# Patient Record
Sex: MALE | Race: AMERICAN INDIAN OR ALASKA NATIVE | Employment: OTHER | URBAN - METROPOLITAN AREA
[De-identification: names, ages, dates, MRNs, and addresses within clinical notes are randomized per-mention and may not be internally consistent; named-entity substitution may affect disease eponyms.]

---

## 2017-08-14 NOTE — PROCEDURE NOTE: CLINICAL
PROCEDURE NOTE: Eylea #3 OS. Diagnosis: Diabetes, Type II with Ocular Complications. Prep: Betadine Flush. Prior to injection, risks/benefits/alternatives discussed including corneal abrasion, infection, loss of vision, hemorrhage, cataract, glaucoma, retinal tears or detachment. A written consent is on file, and the need for today’s injection was discussed and the patient is understanding and wishes to proceed. The entire vial of Eylea was drawn up into a syringe. The opened vial, remaining drug, and filtered needle were disposed of in a certified biohazard container. Betadine prep was performed. Topical anesthesia was induced with Alcaine. 4% lidocaine pledge. A lid speculum was used. A short 30g needle on a 1cc syringe was used with product that that had previously been prepared under sterile conditions. Injection site: 3-4 mm from the limbus. The used syringe/needle was transferred to a biohazard container. Lid speculum removed. Mask worn during procedure. Patient tolerated procedure well. Count fingers vision was verified. There were no complications. Patient was given the standard instruction sheet. Patient given office phone number/answering service number and advised to call immediately should there be loss of vision or pain, or should they have any other questions or concerns. Saad Chan

## 2017-09-18 NOTE — PATIENT DISCUSSION
Recommended Eylea#4 injection today. Injection given without complication and tolerated well. Post injection instructions given and understood by patient.

## 2017-09-18 NOTE — PROCEDURE NOTE: CLINICAL
PROCEDURE NOTE: Eylea #4 OS. Diagnosis: Neovascular AMD with Active CNV. Prior to injection, risks/benefits/alternatives discussed including corneal abrasion, infection, loss of vision, hemorrhage, cataract, glaucoma, retinal tears or detachment. A written consent is on file, and the need for today’s injection was discussed and the patient is understanding and wishes to proceed. The entire vial of Eylea was drawn up into a syringe. The opened vial, remaining drug, and filtered needle were disposed of in a certified biohazard container. Betadine prep was performed. Topical anesthesia was induced with Alcaine. 4% lidocaine pledge. A lid speculum was used. A short 30g needle on a 1cc syringe was used with product that that had previously been prepared under sterile conditions. Injection site: 3-4 mm from the limbus. The used syringe/needle was transferred to a biohazard container. Lid speculum removed. Mask worn during procedure. Patient tolerated procedure well. Count fingers vision was verified. There were no complications. Patient was given the standard instruction sheet. Patient given office phone number/answering service number and advised to call immediately should there be loss of vision or pain, or should they have any other questions or concerns. Azeem Mallory

## 2017-10-02 NOTE — PROCEDURE NOTE: CLINICAL
PROCEDURE NOTE: Eylea #17 OD. Diagnosis: Neovascular AMD with Active CNV. Prior to injection, risks/benefits/alternatives discussed including corneal abrasion, infection, loss of vision, hemorrhage, cataract, glaucoma, retinal tears or detachment. A written consent is on file, and the need for today’s injection was discussed and the patient is understanding and wishes to proceed. The entire vial of Eylea was drawn up into a syringe. The opened vial, remaining drug, and filtered needle were disposed of in a certified biohazard container. Betadine prep was performed. Topical anesthesia was induced with Alcaine. 4% lidocaine pledge. A lid speculum was used. A short 30g needle on a 1cc syringe was used with product that that had previously been prepared under sterile conditions. Injection site: 3-4 mm from the limbus. The used syringe/needle was transferred to a biohazard container. Lid speculum removed. Mask worn during procedure. Patient tolerated procedure well. Count fingers vision was verified. There were no complications. Patient was given the standard instruction sheet. Patient given office phone number/answering service number and advised to call immediately should there be loss of vision or pain, or should they have any other questions or concerns. Azeem Mallory

## 2017-10-02 NOTE — PATIENT DISCUSSION
Treatment only today. Eylea # 17 given today with out complication. Post injection instructions given. Pt verbalized understanding. Call with any [facility] changes.

## 2017-11-06 NOTE — PROCEDURE NOTE: CLINICAL
PROCEDURE NOTE: Eylea #5 OS. Diagnosis: Neovascular AMD with Active CNV. Prior to injection, risks/benefits/alternatives discussed including corneal abrasion, infection, loss of vision, hemorrhage, cataract, glaucoma, retinal tears or detachment. A written consent is on file, and the need for today’s injection was discussed and the patient is understanding and wishes to proceed. The entire vial of Eylea was drawn up into a syringe. The opened vial, remaining drug, and filtered needle were disposed of in a certified biohazard container. Betadine prep was performed. Topical anesthesia was induced with Alcaine. 4% lidocaine pledge. A lid speculum was used. A short 30g needle on a 1cc syringe was used with product that that had previously been prepared under sterile conditions. Injection site: 3-4 mm from the limbus. The used syringe/needle was transferred to a biohazard container. Lid speculum removed. Mask worn during procedure. Patient tolerated procedure well. Count fingers vision was verified. There were no complications. Patient was given the standard instruction sheet. Patient given office phone number/answering service number and advised to call immediately should there be loss of vision or pain, or should they have any other questions or concerns. Kandace Polanco

## 2017-11-06 NOTE — PATIENT DISCUSSION
Recommended Eylea#5  injection today then injection q 10 weeks. Injection given without complication and tolerated well. Post injection instructions given and understood by patient.

## 2017-11-20 NOTE — PROCEDURE NOTE: CLINICAL
PROCEDURE NOTE: Eylea #18 OD. Diagnosis: Neovascular AMD with Active CNV. Prep: Betadine Drops and Betadine Scrub. Prior to injection, risks/benefits/alternatives discussed including corneal abrasion, infection, loss of vision, hemorrhage, cataract, glaucoma, retinal tears or detachment. A written consent is on file, and the need for today’s injection was discussed and the patient is understanding and wishes to proceed. The entire vial of Eylea was drawn up into a syringe. The opened vial, remaining drug, and filtered needle were disposed of in a certified biohazard container. Betadine prep was performed. Topical anesthesia was induced with Alcaine. 4% lidocaine pledge. A lid speculum was used. A short 30g needle on a 1cc syringe was used with product that that had previously been prepared under sterile conditions. Injection site: 3-4 mm from the limbus. The used syringe/needle was transferred to a biohazard container. Lid speculum removed. Mask worn during procedure. Patient tolerated procedure well. Count fingers vision was verified. There were no complications. Patient was given the standard instruction sheet. Patient given office phone number/answering service number and advised to call immediately should there be loss of vision or pain, or should they have any other questions or concerns. Rony Guzman

## 2017-11-20 NOTE — PATIENT DISCUSSION
Injection given without complication and tolerated well. Post injection instructions given and understood by patient.

## 2017-12-18 NOTE — PROCEDURE NOTE: CLINICAL
PROCEDURE NOTE: Eylea #19 OD. Diagnosis: Neovascular AMD with Active CNV. Prep: Betadine Drops and Betadine Scrub. Prior to injection, risks/benefits/alternatives discussed including corneal abrasion, infection, loss of vision, hemorrhage, cataract, glaucoma, retinal tears or detachment. A written consent is on file, and the need for today’s injection was discussed and the patient is understanding and wishes to proceed. The entire vial of Eylea was drawn up into a syringe. The opened vial, remaining drug, and filtered needle were disposed of in a certified biohazard container. Betadine prep was performed. Topical anesthesia was induced with Alcaine. 4% lidocaine pledge. A lid speculum was used. A short 30g needle on a 1cc syringe was used with product that that had previously been prepared under sterile conditions. Injection site: 3-4 mm from the limbus. The used syringe/needle was transferred to a biohazard container. Lid speculum removed. Mask worn during procedure. Patient tolerated procedure well. Count fingers vision was verified. There were no complications. Patient was given the standard instruction sheet. Patient given office phone number/answering service number and advised to call immediately should there be loss of vision or pain, or should they have any other questions or concerns. Richelle Kline

## 2017-12-18 NOTE — PATIENT DISCUSSION
Injection given without complication and tolerated well. Post injection instructions given and understood by patient. return Q7 weeks.

## 2018-01-18 NOTE — PATIENT DISCUSSION
Dariena #6 injection recommended today after discussion of benefits, risks, alternatives. The injection was administered without complication. Post-injection instructions were reviewed and understood by the patient.

## 2018-01-18 NOTE — PROCEDURE NOTE: CLINICAL
PROCEDURE NOTE: Eylea #6 OS. Diagnosis: Neovascular AMD with Active CNV. Prep: Betadine Drops and Betadine Scrub. Prior to injection, risks/benefits/alternatives discussed including corneal abrasion, infection, loss of vision, hemorrhage, cataract, glaucoma, retinal tears or detachment. A written consent is on file, and the need for today’s injection was discussed and the patient is understanding and wishes to proceed. The entire vial of Eylea was drawn up into a syringe. The opened vial, remaining drug, and filtered needle were disposed of in a certified biohazard container. Betadine prep was performed. Topical anesthesia was induced with Alcaine. 4% lidocaine pledge. A lid speculum was used. A short 30g needle on a 1cc syringe was used with product that that had previously been prepared under sterile conditions. Injection site: 3-4 mm from the limbus. The used syringe/needle was transferred to a biohazard container. Lid speculum removed. Mask worn during procedure. Patient tolerated procedure well. Count fingers vision was verified. There were no complications. Patient was given the standard instruction sheet. Patient given office phone number/answering service number and advised to call immediately should there be loss of vision or pain, or should they have any other questions or concerns. Ladan Lubin

## 2018-02-15 NOTE — PROCEDURE NOTE: CLINICAL
PROCEDURE NOTE: Eylea #20 OD. Diagnosis: Neovascular AMD with Active CNV. Prep: Betadine Drops and Betadine Scrub. Prior to injection, risks/benefits/alternatives discussed including corneal abrasion, infection, loss of vision, hemorrhage, cataract, glaucoma, retinal tears or detachment. A written consent is on file, and the need for today’s injection was discussed and the patient is understanding and wishes to proceed. The entire vial of Eylea was drawn up into a syringe. The opened vial, remaining drug, and filtered needle were disposed of in a certified biohazard container. Betadine prep was performed. Topical anesthesia was induced with Alcaine. 4% lidocaine pledge. A lid speculum was used. A short 30g needle on a 1cc syringe was used with product that that had previously been prepared under sterile conditions. Injection site: 3-4 mm from the limbus. The used syringe/needle was transferred to a biohazard container. Lid speculum removed. Mask worn during procedure. Patient tolerated procedure well. Count fingers vision was verified. There were no complications. Patient was given the standard instruction sheet. Patient given office phone number/answering service number and advised to call immediately should there be loss of vision or pain, or should they have any other questions or concerns. Rony Guzman

## 2018-02-15 NOTE — PATIENT DISCUSSION
eylea injection recommended today after discussion of benefits, risks,  The injection was administered without complication. Post-injection instructions were reviewed and understood by the patient.

## 2018-03-05 NOTE — PROCEDURE NOTE: CLINICAL
PROCEDURE NOTE: Punctal Plugs, Extended #2 Bilateral Lower Lids. Diagnosis: Dry Eye Syndrome. Prior to treatment, the risks/benefits/alternatives were discussed. The patient wished to proceed with procedure. Patient tolerated procedure well. There were no complications. Post procedure instructions given. Size *0.6mm Oasis Softplug.

## 2018-03-15 NOTE — PATIENT DISCUSSION
Bonnie #7 injection recommended today after discussion of benefits, risks, and alternatives. The patient elects to proceed The injection was administered without complication. Post-injection instructions were reviewed and understood by the patient.

## 2018-03-15 NOTE — PROCEDURE NOTE: CLINICAL
PROCEDURE NOTE: Eylea #7 OS. Diagnosis: Neovascular AMD with Active CNV. Prior to injection, risks/benefits/alternatives discussed including corneal abrasion, infection, loss of vision, hemorrhage, cataract, glaucoma, retinal tears or detachment. A written consent is on file, and the need for today’s injection was discussed and the patient is understanding and wishes to proceed. The entire vial of Eylea was drawn up into a syringe. The opened vial, remaining drug, and filtered needle were disposed of in a certified biohazard container. Betadine prep was performed. Topical anesthesia was induced with Alcaine. 4% lidocaine pledge. A lid speculum was used. A short 30g needle on a 1cc syringe was used with product that that had previously been prepared under sterile conditions. Injection site: 3-4 mm from the limbus. The used syringe/needle was transferred to a biohazard container. Lid speculum removed. Mask worn during procedure. Patient tolerated procedure well. Count fingers vision was verified. There were no complications. Patient was given the standard instruction sheet. Patient given office phone number/answering service number and advised to call immediately should there be loss of vision or pain, or should they have any other questions or concerns. Shawn Steven

## 2018-03-29 NOTE — PROCEDURE NOTE: CLINICAL
PROCEDURE NOTE: Eylea #21 OD. Diagnosis: Neovascular AMD with Active CNV. Prior to injection, risks/benefits/alternatives discussed including corneal abrasion, infection, loss of vision, hemorrhage, cataract, glaucoma, retinal tears or detachment. A written consent is on file, and the need for today’s injection was discussed and the patient is understanding and wishes to proceed. The entire vial of Eylea was drawn up into a syringe. The opened vial, remaining drug, and filtered needle were disposed of in a certified biohazard container. Betadine prep was performed. Topical anesthesia was induced with Alcaine. 4% lidocaine pledge. A lid speculum was used. A short 30g needle on a 1cc syringe was used with product that that had previously been prepared under sterile conditions. Injection site: 3-4 mm from the limbus. The used syringe/needle was transferred to a biohazard container. Lid speculum removed. Mask worn during procedure. Patient tolerated procedure well. Count fingers vision was verified. There were no complications. Patient was given the standard instruction sheet. Patient given office phone number/answering service number and advised to call immediately should there be loss of vision or pain, or should they have any other questions or concerns. Kandace Polanco

## 2018-03-29 NOTE — PATIENT DISCUSSION
Eylea injection recommended today after discussion of benefits, risks, and alternatives. The patient elects to proceed The injection was administered without complication. Post-injection instructions were reviewed and understood by the patient.

## 2018-04-25 NOTE — PROCEDURE NOTE: CLINICAL
PROCEDURE NOTE: Punctal Plugs, Silicone #1 Right Lower Lid. Anesthesia: Topical. Prep: Antibiotic Drops q 5min x 3. Prior to treatment, the risks/benefits/alternatives were discussed. The patient wished to proceed with procedure. Permanent silicone plugs were inserted. Patient tolerated procedure well. There were no complications. Post procedure instructions given. Size *. 6MM RLL.

## 2018-05-10 NOTE — PATIENT DISCUSSION
No new SRF/IRF.  Vision stable.  Will continue treatment  q 8 weeks OU.  Follow up in 2 weeks for injection only.

## 2018-05-10 NOTE — PATIENT DISCUSSION
5/10/18-Patient to keep 5/24/18 appointment for scheduled treatment OD, return in 8 weeks for possible repeat injection OS.  Patient receiving treatment OU q 8 weeks-alternating weeks.

## 2018-05-10 NOTE — PATIENT DISCUSSION
Decision to treat was made based on today's clinical findings. Recommend treatment today, goal is to maintain current level of vision.

## 2018-05-10 NOTE — PATIENT DISCUSSION
Minimal, Discussed the importance of blood pressure control in the prevention of ocular complications.

## 2018-05-10 NOTE — PATIENT DISCUSSION
Bonnie #8 injection recommended today after discussion of benefits, risks, alternatives. The injection was administered without complication. Post-injection instructions were reviewed and understood by the patient.

## 2018-05-10 NOTE — PROCEDURE NOTE: CLINICAL
PROCEDURE NOTE: Eylea #8 OS. Diagnosis: Neovascular AMD with Active CNV. Prior to injection, risks/benefits/alternatives discussed including corneal abrasion, infection, loss of vision, hemorrhage, cataract, glaucoma, retinal tears or detachment. A written consent is on file, and the need for today’s injection was discussed and the patient is understanding and wishes to proceed. The entire vial of Eylea was drawn up into a syringe. The opened vial, remaining drug, and filtered needle were disposed of in a certified biohazard container. Betadine prep was performed. Topical anesthesia was induced with Alcaine. 4% lidocaine pledge. A lid speculum was used. A short 30g needle on a 1cc syringe was used with product that that had previously been prepared under sterile conditions. Injection site: 3-4 mm from the limbus. The used syringe/needle was transferred to a biohazard container. Lid speculum removed. Mask worn during procedure. Patient tolerated procedure well. Count fingers vision was verified. There were no complications. Patient was given the standard instruction sheet. Patient given office phone number/answering service number and advised to call immediately should there be loss of vision or pain, or should they have any other questions or concerns. Barbi Balderrama

## 2018-05-24 NOTE — PATIENT DISCUSSION
Eylea injection recommended today after discussion of benefits, risks, alternatives. The injection was administered without complication. Post-injection instructions were reviewed and understood by the patient.  S/S of endophthalmitis discussed with patient, patient to call with any changes in vision or S/S of endophthalmitis.

## 2018-05-24 NOTE — PATIENT DISCUSSION
No new SRF/IRF.  Vision stable. Recommend treatment today as previously discussed.  Goal is to maintain current level of vision.  Patient states understanding.

## 2018-05-24 NOTE — PROCEDURE NOTE: CLINICAL
PROCEDURE NOTE: Eylea #22 OD. Diagnosis: Neovascular AMD with Active CNV. Prior to injection, risks/benefits/alternatives discussed including corneal abrasion, infection, loss of vision, hemorrhage, cataract, glaucoma, retinal tears or detachment. A written consent is on file, and the need for today’s injection was discussed and the patient is understanding and wishes to proceed. The entire vial of Eylea was drawn up into a syringe. The opened vial, remaining drug, and filtered needle were disposed of in a certified biohazard container. Betadine prep was performed. Topical anesthesia was induced with Alcaine. 4% lidocaine pledge. A lid speculum was used. A short 30g needle on a 1cc syringe was used with product that that had previously been prepared under sterile conditions. Injection site: 3-4 mm from the limbus. The used syringe/needle was transferred to a biohazard container. Lid speculum removed. Mask worn during procedure. Patient tolerated procedure well. Count fingers vision was verified. There were no complications. Patient was given the standard instruction sheet. Patient given office phone number/answering service number and advised to call immediately should there be loss of vision or pain, or should they have any other questions or concerns. Bennett Dickey

## 2018-06-19 NOTE — PROCEDURE NOTE: CLINICAL
PROCEDURE NOTE: Punctal Plugs, Silicone #1 Right Lower Lid. Prior to treatment, the risks/benefits/alternatives were discussed. The patient wished to proceed with procedure. Permanent silicone plugs were inserted. Patient tolerated procedure well. There were no complications. Post procedure instructions given. Size *.6mm RLL.

## 2018-07-12 NOTE — PATIENT DISCUSSION
Dariena #9 injection recommended today after discussion of benefits, risks, alternatives. The patient elects to proceed. The injection was administered without complication. Post-injection instructions were reviewed and understood by the patient.

## 2018-07-12 NOTE — PROCEDURE NOTE: CLINICAL
PROCEDURE NOTE: Eylea #9 OS. Diagnosis: Neovascular AMD with Active CNV. Prep: Betadine Drops and Betadine Scrub. Prior to injection, risks/benefits/alternatives discussed including corneal abrasion, infection, loss of vision, hemorrhage, cataract, glaucoma, retinal tears or detachment. A written consent is on file, and the need for today’s injection was discussed and the patient is understanding and wishes to proceed. The entire vial of Eylea was drawn up into a syringe. The opened vial, remaining drug, and filtered needle were disposed of in a certified biohazard container. Betadine prep was performed. Topical anesthesia was induced with Alcaine. 4% lidocaine pledge. A lid speculum was used. A short 30g needle on a 1cc syringe was used with product that that had previously been prepared under sterile conditions. Injection site: 3-4 mm from the limbus. The used syringe/needle was transferred to a biohazard container. Lid speculum removed. Mask worn during procedure. Patient tolerated procedure well. Count fingers vision was verified. There were no complications. Patient was given the standard instruction sheet. Patient given office phone number/answering service number and advised to call immediately should there be loss of vision or pain, or should they have any other questions or concerns. Shawn Steven PROCEDURE NOTE: A/C Paracentesis #1 OS. Prior to treatment, the risks/benefits/alternatives were discussed. The patient wished to proceed with procedure. Location of Tap: Temporal Limbus. The eye was prepped with topical Betadine 5%, a sterile lid speculum was placed in the eye. Volume of tap: 0.1 ml. Patient tolerated procedure well. There were no complications. Post procedure instructions given. Patient given office phone number/answering service number and advised to call immediately should there be an increase in floaters or redness, loss of vision or pain, or should they have any other questions or concerns. Shawn Steven

## 2018-07-12 NOTE — PATIENT DISCUSSION
07/12/2018 (RETINA)- Injection ONLY at the next visit.  OCT/FA/FP at the following visit. POSSIBLE Eylea injection at that time. Recommended continuing injection intervals at 9-10 weeks OU.

## 2018-07-12 NOTE — PATIENT DISCUSSION
No new Subretinal/Intraretinal fluid seen on today's examination and diagnostic testing. Recommended repeat injection today and following up with treat and extend in 9-10 weeks for possible Eylea injection.

## 2018-07-19 NOTE — PROCEDURE NOTE: CLINICAL
PROCEDURE NOTE: Eylea #22 OD. Diagnosis: Neovascular AMD with Active CNV. Prior to injection, risks/benefits/alternatives discussed including corneal abrasion, infection, loss of vision, hemorrhage, cataract, glaucoma, retinal tears or detachment. A written consent is on file, and the need for today’s injection was discussed and the patient is understanding and wishes to proceed. The entire vial of Eylea was drawn up into a syringe. The opened vial, remaining drug, and filtered needle were disposed of in a certified biohazard container. Betadine prep was performed. Topical anesthesia was induced with Alcaine. 4% lidocaine pledge. A lid speculum was used. A short 30g needle on a 1cc syringe was used with product that that had previously been prepared under sterile conditions. Injection site: 3-4 mm from the limbus. The used syringe/needle was transferred to a biohazard container. Lid speculum removed. Mask worn during procedure. Patient tolerated procedure well. Count fingers vision was verified. There were no complications. Patient was given the standard instruction sheet. Patient given office phone number/answering service number and advised to call immediately should there be loss of vision or pain, or should they have any other questions or concerns. Baljeet Ortiz

## 2018-07-19 NOTE — PATIENT DISCUSSION
Bonnie #22 injection recommended today after discussion of benefits, risks, alternatives. The patient elects to proceed. The injection was administered without complication. Post-injection instructions were reviewed and understood by the patient.

## 2018-07-19 NOTE — PATIENT DISCUSSION
07/19/2018 (RETINA)- OCT/FA/FP at the next visit. POSSIBLE Eylea injection at that time. Recommended continuing injection intervals at 9-10 weeks OU.

## 2018-10-04 NOTE — PATIENT DISCUSSION
No new Subretinal/Intraretinal fluid seen on today's examination and diagnostic testing. Recommended repeat injection today and following up in 11-12 weeks for possible Eylea injection.

## 2018-10-04 NOTE — PATIENT DISCUSSION
Eylea injection recommended today after discussion of benefits, risks, alternatives. The patient elects to proceed. The injection was administered without complication. Post-injection instructions were reviewed and understood by the patient.

## 2018-10-04 NOTE — PATIENT DISCUSSION
No new SRF/IRF, stable.  Recommend continuing treatment, follow up in 11-12 weeks.  Goal is to maintain current level of vision.  Patient to return in 1 week for treatment only OD.

## 2018-10-04 NOTE — PATIENT DISCUSSION
10/04/2018 (RETINA)- OCT/FA/FP at the next visit. POSSIBLE Eylea injection at that time. Recommended continuing injection intervals 11-12 weeks OU.  Patient to return in 1 week treatment only OD.

## 2018-10-04 NOTE — PROCEDURE NOTE: CLINICAL
PROCEDURE NOTE: Eylea 2mg OS. Diagnosis: Neovascular AMD with Active CNV. Prior to injection, risks/benefits/alternatives discussed including corneal abrasion, infection, loss of vision, hemorrhage, cataract, glaucoma, retinal tears or detachment. A written consent is on file, and the need for today’s injection was discussed and the patient is understanding and wishes to proceed. The entire vial of Eylea was drawn up into a syringe. The opened vial, remaining drug, and filtered needle were disposed of in a certified biohazard container. Betadine prep was performed. Topical anesthesia was induced with Alcaine. 4% lidocaine pledge. A lid speculum was used. A short 30g needle on a 1cc syringe was used with product that that had previously been prepared under sterile conditions. Injection site: 3-4 mm from the limbus. The used syringe/needle was transferred to a biohazard container. Lid speculum removed. Mask worn during procedure. Patient tolerated procedure well. Count fingers vision was verified. There were no complications. Patient was given the standard instruction sheet. Patient given office phone number/answering service number and advised to call immediately should there be loss of vision or pain, or should they have any other questions or concerns. Fela Gaona

## 2018-10-11 NOTE — PATIENT DISCUSSION
Recommend continuing treatment, follow up in 11-12 weeks.  Goal is to maintain current level of vision. RTC as scheduled 12/2018 BRODY/OCT/roma Nicole OS.

## 2018-10-11 NOTE — PROCEDURE NOTE: CLINICAL
PROCEDURE NOTE: Eylea 2mg OD. Diagnosis: Neovascular AMD with Active CNV. Prior to injection, risks/benefits/alternatives discussed including corneal abrasion, infection, loss of vision, hemorrhage, cataract, glaucoma, retinal tears or detachment. A written consent is on file, and the need for today’s injection was discussed and the patient is understanding and wishes to proceed. The entire vial of Eylea was drawn up into a syringe. The opened vial, remaining drug, and filtered needle were disposed of in a certified biohazard container. Betadine prep was performed. Topical anesthesia was induced with Alcaine. 4% lidocaine pledge. A lid speculum was used. A short 30g needle on a 1cc syringe was used with product that that had previously been prepared under sterile conditions. Injection site: 3-4 mm from the limbus. The used syringe/needle was transferred to a biohazard container. Lid speculum removed. Mask worn during procedure. Patient tolerated procedure well. Count fingers vision was verified. There were no complications. Patient was given the standard instruction sheet. Patient given office phone number/answering service number and advised to call immediately should there be loss of vision or pain, or should they have any other questions or concerns. Ladan Lubin

## 2018-12-12 NOTE — PATIENT DISCUSSION
Discussed the importance of blood pressure control in the prevention of ocular complications. Complex Repair And Flap Additional Text (Will Appearing After The Standard Complex Repair Text): The complex repair was not sufficient to completely close the primary defect. The remaining additional defect was repaired with the flap mentioned below.

## 2018-12-20 NOTE — PATIENT DISCUSSION
Patient will return in 2 weeks for treatment only on OD. Will keep injection intervals at 10/11 weeks OD.

## 2018-12-20 NOTE — PROCEDURE NOTE: CLINICAL
PROCEDURE NOTE: Eylea 2mg OS. Diagnosis: Neovascular AMD with Active CNV. Prior to injection, risks/benefits/alternatives discussed including corneal abrasion, infection, loss of vision, hemorrhage, cataract, glaucoma, retinal tears or detachment. A written consent is on file, and the need for today’s injection was discussed and the patient is understanding and wishes to proceed. The entire vial of Eylea was drawn up into a syringe. The opened vial, remaining drug, and filtered needle were disposed of in a certified biohazard container. Betadine prep was performed. Topical anesthesia was induced with Alcaine. 4% lidocaine pledge. A lid speculum was used. A short 30g needle on a 1cc syringe was used with product that that had previously been prepared under sterile conditions. Injection site: 3-4 mm from the limbus. The used syringe/needle was transferred to a biohazard container. Lid speculum removed. Mask worn during procedure. Patient tolerated procedure well. Count fingers vision was verified. There were no complications. Patient was given the standard instruction sheet. Patient given office phone number/answering service number and advised to call immediately should there be loss of vision or pain, or should they have any other questions or concerns. Tasha Mei

## 2018-12-20 NOTE — PATIENT DISCUSSION
No new Subretinal/Intraretinal fluid, Increased atrophy & PED seen on today's examination and diagnostic testing. Recommended repeat injection today and following up in 10/11 weeks for possible Eylea injection.

## 2019-01-03 NOTE — PROCEDURE NOTE: CLINICAL
PROCEDURE NOTE: Eylea 2mg OD. Diagnosis: Neovascular AMD with Active CNV. Prior to injection, risks/benefits/alternatives discussed including corneal abrasion, infection, loss of vision, hemorrhage, cataract, glaucoma, retinal tears or detachment. A written consent is on file, and the need for today’s injection was discussed and the patient is understanding and wishes to proceed. The entire vial of Eylea was drawn up into a syringe. The opened vial, remaining drug, and filtered needle were disposed of in a certified biohazard container. Betadine prep was performed. Topical anesthesia was induced with Alcaine. 4% lidocaine pledge. A lid speculum was used. A short 30g needle on a 1cc syringe was used with product that that had previously been prepared under sterile conditions. Injection site: 3-4 mm from the limbus. The used syringe/needle was transferred to a biohazard container. Lid speculum removed. Mask worn during procedure. Patient tolerated procedure well. Count fingers vision was verified. There were no complications. Patient was given the standard instruction sheet. Patient given office phone number/answering service number and advised to call immediately should there be loss of vision or pain, or should they have any other questions or concerns. Jordi Bella

## 2019-01-03 NOTE — PATIENT DISCUSSION
Patient here for treatment only as discussed at last visit.  No new SRF/IRF.  Recommend continuing with treatment today, goal is to maintain current level of vision. Will keep injection intervals at 10/11 weeks.

## 2019-03-07 NOTE — PATIENT DISCUSSION
Increased PED and atrophy, no new SRF/IRF seen on today's examination and diagnostic testing, improved. Recommended repeat injection today and following up in 11-12 weeks for possible Eylea injection.

## 2019-03-07 NOTE — PATIENT DISCUSSION
Increased PED, no new SRF/IRF, improved.  Recommend continuing with treatment, patient to return on 3/28/19 for treatment only.

## 2019-03-07 NOTE — PROCEDURE NOTE: CLINICAL
PROCEDURE NOTE: Eylea 2mg OS. Diagnosis: Neovascular AMD with Active CNV. Prior to injection, risks/benefits/alternatives discussed including corneal abrasion, infection, loss of vision, hemorrhage, cataract, glaucoma, retinal tears or detachment. A written consent is on file, and the need for today’s injection was discussed and the patient is understanding and wishes to proceed. The entire vial of Eylea was drawn up into a syringe. The opened vial, remaining drug, and filtered needle were disposed of in a certified biohazard container. Betadine prep was performed. Topical anesthesia was induced with Alcaine. 4% lidocaine pledge. A lid speculum was used. A short 30g needle on a 1cc syringe was used with product that that had previously been prepared under sterile conditions. Injection site: 3-4 mm from the limbus. The used syringe/needle was transferred to a biohazard container. Lid speculum removed. Mask worn during procedure. Patient tolerated procedure well. Count fingers vision was verified. There were no complications. Patient was given the standard instruction sheet. Patient given office phone number/answering service number and advised to call immediately should there be loss of vision or pain, or should they have any other questions or concerns. Saad Chan

## 2019-03-28 NOTE — PROCEDURE NOTE: CLINICAL
PROCEDURE NOTE: Eylea 2mg OD. Diagnosis: Neovascular AMD with Active CNV. Prior to injection, risks/benefits/alternatives discussed including corneal abrasion, infection, loss of vision, hemorrhage, cataract, glaucoma, retinal tears or detachment. A written consent is on file, and the need for today’s injection was discussed and the patient is understanding and wishes to proceed. The entire vial of Eylea was drawn up into a syringe. The opened vial, remaining drug, and filtered needle were disposed of in a certified biohazard container. Betadine prep was performed. Topical anesthesia was induced with Alcaine. 4% lidocaine pledge. A lid speculum was used. A short 30g needle on a 1cc syringe was used with product that that had previously been prepared under sterile conditions. Injection site: 3-4 mm from the limbus. The used syringe/needle was transferred to a biohazard container. Lid speculum removed. Mask worn during procedure. Patient tolerated procedure well. Count fingers vision was verified. There were no complications. Patient was given the standard instruction sheet. Patient given office phone number/answering service number and advised to call immediately should there be loss of vision or pain, or should they have any other questions or concerns. Kiel Weiner

## 2019-03-28 NOTE — PATIENT DISCUSSION
Increased PED, no new SRF/IRF, improved.  Recommend continuing with treatment today as previously discussed.

## 2019-04-01 NOTE — PROCEDURE NOTE: CLINICAL
PROCEDURE NOTE: Punctal Plugs, Silicone #1 Right Lower Lid. Anesthesia: Topical. Prep: Antibiotic Drops q 5min x 3. Prior to treatment, the risks/benefits/alternatives were discussed. The patient wished to proceed with procedure. Permanent silicone plugs were inserted. Patient tolerated procedure well. There were no complications. Post procedure instructions given. Size *.7 MM RLL.

## 2019-05-30 NOTE — PATIENT DISCUSSION
No new SRF/IRF seen on today's examination and diagnostic testing, improved. Recommended repeat injection today and following up in 11-12 weeks for possible Eylea injection.

## 2019-05-30 NOTE — PROCEDURE NOTE: CLINICAL
PROCEDURE NOTE: Eylea 2mg OS. Diagnosis: Hemorrhagic Pigment Epithelial Detachment. Prior to injection, risks/benefits/alternatives discussed including corneal abrasion, infection, loss of vision, hemorrhage, cataract, glaucoma, retinal tears or detachment. A written consent is on file, and the need for today’s injection was discussed and the patient is understanding and wishes to proceed. The entire vial of Eylea was drawn up into a syringe. The opened vial, remaining drug, and filtered needle were disposed of in a certified biohazard container. Betadine prep was performed. Topical anesthesia was induced with Alcaine. 4% lidocaine pledge. A lid speculum was used. A short 30g needle on a 1cc syringe was used with product that that had previously been prepared under sterile conditions. Injection site: 3-4 mm from the limbus. The used syringe/needle was transferred to a biohazard container. Lid speculum removed. Mask worn during procedure. Patient tolerated procedure well. Count fingers vision was verified. There were no complications. Patient was given the standard instruction sheet. Patient given office phone number/answering service number and advised to call immediately should there be loss of vision or pain, or should they have any other questions or concerns. Shefali Mary

## 2019-06-20 NOTE — PROCEDURE NOTE: CLINICAL
PROCEDURE NOTE: Eylea 2mg OD. Diagnosis: Neovascular AMD with Active CNV. Prior to injection, risks/benefits/alternatives discussed including corneal abrasion, infection, loss of vision, hemorrhage, cataract, glaucoma, retinal tears or detachment. A written consent is on file, and the need for today’s injection was discussed and the patient is understanding and wishes to proceed. The entire vial of Eylea was drawn up into a syringe. The opened vial, remaining drug, and filtered needle were disposed of in a certified biohazard container. Betadine prep was performed. Topical anesthesia was induced with Alcaine. 4% lidocaine pledge. A lid speculum was used. A short 30g needle on a 1cc syringe was used with product that that had previously been prepared under sterile conditions. Injection site: 3-4 mm from the limbus. The used syringe/needle was transferred to a biohazard container. Lid speculum removed. Mask worn during procedure. Patient tolerated procedure well. Count fingers vision was verified. There were no complications. Patient was given the standard instruction sheet. Patient given office phone number/answering service number and advised to call immediately should there be loss of vision or pain, or should they have any other questions or concerns. Spenser Mott

## 2019-08-22 NOTE — PATIENT DISCUSSION
No fluid, increased atrophy. Decision to treat was made based on today's clinical findings. Will treat & see back in [id number].

## 2019-08-22 NOTE — PROCEDURE NOTE: CLINICAL
PROCEDURE NOTE: Eylea 2mg OS. Diagnosis: Neovascular AMD with Active CNV. Prior to injection, risks/benefits/alternatives discussed including corneal abrasion, infection, loss of vision, hemorrhage, cataract, glaucoma, retinal tears or detachment. A written consent is on file, and the need for today’s injection was discussed and the patient is understanding and wishes to proceed. The entire vial of Eylea was drawn up into a syringe. The opened vial, remaining drug, and filtered needle were disposed of in a certified biohazard container. Betadine prep was performed. Topical anesthesia was induced with Alcaine. 4% lidocaine pledge. A lid speculum was used. A short 30g needle on a 1cc syringe was used with product that that had previously been prepared under sterile conditions. Injection site: 3-4 mm from the limbus. The used syringe/needle was transferred to a biohazard container. Lid speculum removed. Mask worn during procedure. Patient tolerated procedure well. Count fingers vision was verified. There were no complications. Patient was given the standard instruction sheet. Patient given office phone number/answering service number and advised to call immediately should there be loss of vision or pain, or should they have any other questions or concerns. Queen Luis

## 2019-09-05 NOTE — PATIENT DISCUSSION
Grade II hypertensive retinopathy. Graft Donor Site Bandage (Optional-Leave Blank If You Don't Want In Note): Steri-strips and a pressure bandage were applied to the donor site.

## 2019-09-05 NOTE — PROCEDURE NOTE: CLINICAL
PROCEDURE NOTE: Eylea 2mg OD. Diagnosis: Neovascular AMD with Active CNV. Prior to injection, risks/benefits/alternatives discussed including corneal abrasion, infection, loss of vision, hemorrhage, cataract, glaucoma, retinal tears or detachment. A written consent is on file, and the need for today’s injection was discussed and the patient is understanding and wishes to proceed. The entire vial of Eylea was drawn up into a syringe. The opened vial, remaining drug, and filtered needle were disposed of in a certified biohazard container. Betadine prep was performed. Topical anesthesia was induced with Alcaine. 4% lidocaine pledge. A lid speculum was used. A short 30g needle on a 1cc syringe was used with product that that had previously been prepared under sterile conditions. Injection site: 3-4 mm from the limbus. The used syringe/needle was transferred to a biohazard container. Lid speculum removed. Mask worn during procedure. Patient tolerated procedure well. Count fingers vision was verified. There were no complications. Patient was given the standard instruction sheet. Patient given office phone number/answering service number and advised to call immediately should there be loss of vision or pain, or should they have any other questions or concerns. Barbi Balderrama

## 2019-11-07 NOTE — PATIENT DISCUSSION
No treatment at this time. Patient to return in 2 weeks for treatment only.  PED, no  fluid, slightly improved.

## 2019-11-21 NOTE — PROCEDURE NOTE: CLINICAL
PROCEDURE NOTE: Eylea 2mg OD. Diagnosis: Neovascular AMD with Active CNV. Prior to injection, risks/benefits/alternatives discussed including corneal abrasion, infection, loss of vision, hemorrhage, cataract, glaucoma, retinal tears or detachment. A written consent is on file, and the need for today’s injection was discussed and the patient is understanding and wishes to proceed. The entire vial of Eylea was drawn up into a syringe. The opened vial, remaining drug, and filtered needle were disposed of in a certified biohazard container. Betadine prep was performed. Topical anesthesia was induced with Alcaine. 4% lidocaine pledge. A lid speculum was used. A short 30g needle on a 1cc syringe was used with product that that had previously been prepared under sterile conditions. Injection site: 3-4 mm from the limbus. The used syringe/needle was transferred to a biohazard container. Lid speculum removed. Mask worn during procedure. Patient tolerated procedure well. Count fingers vision was verified. There were no complications. Patient was given the standard instruction sheet. Patient given office phone number/answering service number and advised to call immediately should there be loss of vision or pain, or should they have any other questions or concerns. Jay Shelley

## 2020-01-23 NOTE — PROCEDURE NOTE: CLINICAL
PROCEDURE NOTE: Eylea 2mg OS. Diagnosis: Neovascular AMD with Active CNV. Prior to injection, risks/benefits/alternatives discussed including corneal abrasion, infection, loss of vision, hemorrhage, cataract, glaucoma, retinal tears or detachment. A written consent is on file, and the need for today’s injection was discussed and the patient is understanding and wishes to proceed. The entire vial of Eylea was drawn up into a syringe. The opened vial, remaining drug, and filtered needle were disposed of in a certified biohazard container. Betadine prep was performed. Topical anesthesia was induced with Alcaine. 4% lidocaine pledge. A lid speculum was used. A short 30g needle on a 1cc syringe was used with product that that had previously been prepared under sterile conditions. Injection site: 3-4 mm from the limbus. The used syringe/needle was transferred to a biohazard container. Lid speculum removed. Mask worn during procedure. Patient tolerated procedure well. Count fingers vision was verified. There were no complications. Patient was given the standard instruction sheet. Patient given office phone number/answering service number and advised to call immediately should there be loss of vision or pain, or should they have any other questions or concerns. Gema Mayberry

## 2020-01-28 NOTE — PATIENT DISCUSSION
Eylea injection recommended today after discussion of benefits, risks, alternatives. The injection was administered without complication. Post-injection instructions were reviewed and understood by the patient. This nurse returned pt's call. Pt wanted to confirm what meds were sent to her pharmacy today. This nurse instructed her it was Levaquin 500 mg once daily for 7 days. Pt verbalized understanding.

## 2020-02-06 NOTE — PROCEDURE NOTE: CLINICAL
PROCEDURE NOTE: Eylea 2mg OD. Diagnosis: Neovascular AMD with Active CNV. Prep: Betadine Drops and Betadine Scrub. Prior to injection, risks/benefits/alternatives discussed including corneal abrasion, infection, loss of vision, hemorrhage, cataract, glaucoma, retinal tears or detachment. A written consent is on file, and the need for today’s injection was discussed and the patient is understanding and wishes to proceed. The entire vial of Eylea was drawn up into a syringe. The opened vial, remaining drug, and filtered needle were disposed of in a certified biohazard container. Betadine prep was performed. Topical anesthesia was induced with Alcaine. 4% lidocaine pledge. A lid speculum was used. A short 30g needle on a 1cc syringe was used with product that that had previously been prepared under sterile conditions. Injection site: 3-4 mm from the limbus. The used syringe/needle was transferred to a biohazard container. Lid speculum removed. Mask worn during procedure. Patient tolerated procedure well. Count fingers vision was verified. There were no complications. Patient was given the standard instruction sheet. Patient given office phone number/answering service number and advised to call immediately should there be loss of vision or pain, or should they have any other questions or concerns. Kiel Wenier

## 2020-04-09 NOTE — PATIENT DISCUSSION
Decision to treat was made based on today's clinical findings.  Decreased Atrophy, Stable PED, no fluid,  improved.

## 2020-04-09 NOTE — PROCEDURE NOTE: CLINICAL
PROCEDURE NOTE: Eylea Prefilled Syringe 2mg OS. Diagnosis: Neovascular AMD with Active CNV. Prior to injection, risks/benefits/alternatives discussed including corneal abrasion, infection, loss of vision, hemorrhage, cataract, glaucoma, retinal tears or detachment. A written consent is on file, and the need for today's injection was discussed and the patient is understanding and wishes to proceed. A 30G needle was placed on an Eylea 2mg/0.05ml Pre-filled Syringe. Betadine prep was performed. Topical anesthesia was induced with Alcaine. 4% lidocaine pledge. A lid speculum was used. An intravitreal injection of Eylea was given. Injection site: 3-4 mm from the limbus. The used syringe/needle was transferred to a biohazard container. Lid speculum removed. Mask worn during procedure. Patient tolerated procedure well. Count fingers vision was verified. There were no complications. Patient was given the standard instruction sheet. [facility]

## 2020-04-13 NOTE — PATIENT DISCUSSION
See WET AMD for IMP/PLAN. supraclavicular L/anterior cervical L/anterior cervical R/supraclavicular R/posterior cervical R/posterior cervical L

## 2020-04-13 NOTE — PROCEDURE NOTE: CLINICAL
PROCEDURE NOTE: Eylea Prefilled Syringe 2mg OD. Diagnosis: Neovascular AMD with Active CNV. Prior to injection, risks/benefits/alternatives discussed including corneal abrasion, infection, loss of vision, hemorrhage, cataract, glaucoma, retinal tears or detachment. A written consent is on file, and the need for today's injection was discussed and the patient is understanding and wishes to proceed. A 30G needle was placed on an Eylea 2mg/0.05ml Pre-filled Syringe. Betadine prep was performed. Topical anesthesia was induced with Alcaine. 4% lidocaine pledge. A lid speculum was used. An intravitreal injection of Eylea was given. Injection site: 3-4 mm from the limbus. The used syringe/needle was transferred to a biohazard container. Lid speculum removed. Mask worn during procedure. Patient tolerated procedure well. Count fingers vision was verified. There were no complications. Patient was given the standard instruction sheet. Spenser Mott

## 2020-07-08 ENCOUNTER — NEW PATIENT EMERGENCY (OUTPATIENT)
Dept: URBAN - METROPOLITAN AREA CLINIC 36 | Facility: CLINIC | Age: 25
End: 2020-07-08

## 2020-07-08 DIAGNOSIS — T15.02XA: ICD-10-CM

## 2020-07-08 DIAGNOSIS — H57.12: ICD-10-CM

## 2020-07-08 PROCEDURE — 92002 INTRM OPH EXAM NEW PATIENT: CPT

## 2020-07-08 PROCEDURE — 65435 CURETTE/TREAT CORNEA: CPT

## 2020-07-08 PROCEDURE — 65222 REMOVE FOREIGN BODY FROM EYE: CPT

## 2020-07-08 ASSESSMENT — TONOMETRY
OS_IOP_MMHG: 17
OD_IOP_MMHG: 15

## 2020-07-08 ASSESSMENT — VISUAL ACUITY
OU_CC: J1+
OS_CC: 20/25
OU_CC: 20/20
OS_CC: J1
OD_CC: 20/20-1
OD_CC: J1+

## 2020-07-16 NOTE — PROCEDURE NOTE: CLINICAL
PROCEDURE NOTE: Eylea Prefilled Syringe 2mg OD. Diagnosis: Neovascular AMD with Active CNV. Prior to injection, risks/benefits/alternatives discussed including corneal abrasion, infection, loss of vision, hemorrhage, cataract, glaucoma, retinal tears or detachment. A written consent is on file, and the need for today's injection was discussed and the patient is understanding and wishes to proceed. A 30G needle was placed on an Eylea 2mg/0.05ml Pre-filled Syringe. Betadine prep was performed. Topical anesthesia was induced with Alcaine. 4% lidocaine pledge. A lid speculum was used. An intravitreal injection of Eylea was given. Injection site: 3-4 mm from the limbus. The used syringe/needle was transferred to a biohazard container. Lid speculum removed. Mask worn during procedure. Patient tolerated procedure well. Count fingers vision was verified. There were no complications. Patient was given the standard instruction sheet. Brenna Mulligan

## 2020-09-23 NOTE — PROCEDURE NOTE: CLINICAL
PROCEDURE NOTE: Punctal Plugs, Silicone #1 Right Lower Lid. Anesthesia: Topical. Prep: Antibiotic Drops q 5min x 3. Prior to treatment, the risks/benefits/alternatives were discussed. The patient wished to proceed with procedure. One drop of proparacaine was placed and a drop of lidocaine gel was placed over the puncta. 0.7 mm Oasis permanent silicone plugs were inserted in RLL eyelids. Patient tolerated procedure well. There were no complications. Post procedure instructions given. Baljeet Ortiz

## 2020-10-01 NOTE — PROCEDURE NOTE: CLINICAL
PROCEDURE NOTE: Eylea Prefilled Syringe 2mg OS. Diagnosis: Neovascular AMD with Active CNV. Prior to injection, risks/benefits/alternatives discussed including corneal abrasion, infection, loss of vision, hemorrhage, cataract, glaucoma, retinal tears or detachment. A written consent is on file, and the need for today's injection was discussed and the patient is understanding and wishes to proceed. A 30G needle was placed on an Eylea 2mg/0.05ml Pre-filled Syringe. Betadine prep was performed. Topical anesthesia was induced with Alcaine. 4% lidocaine pledge. A lid speculum was used. An intravitreal injection of Eylea was given. Injection site: 3-4 mm from the limbus. The used syringe/needle was transferred to a biohazard container. Lid speculum removed. Mask worn during procedure. Patient tolerated procedure well. Count fingers vision was verified. There were no complications. Patient was given the standard instruction sheet. Jordi Bella

## 2020-10-08 NOTE — PROCEDURE NOTE: CLINICAL
PROCEDURE NOTE: Eylea Prefilled Syringe 2mg OD. Diagnosis: Neovascular AMD with Active CNV. Prior to injection, risks/benefits/alternatives discussed including corneal abrasion, infection, loss of vision, hemorrhage, cataract, glaucoma, retinal tears or detachment. A written consent is on file, and the need for today's injection was discussed and the patient is understanding and wishes to proceed. A 30G needle was placed on an Eylea 2mg/0.05ml Pre-filled Syringe. Betadine prep was performed. Topical anesthesia was induced with Alcaine. 4% lidocaine pledge. A lid speculum was used. An intravitreal injection of Eylea was given. Injection site: 3-4 mm from the limbus. The used syringe/needle was transferred to a biohazard container. Lid speculum removed. Mask worn during procedure. Patient tolerated procedure well. Count fingers vision was verified. There were no complications. Patient was given the standard instruction sheet. Jay Shelley

## 2020-11-25 NOTE — PATIENT DISCUSSION
This visual field clearly demonstrated a minimum of 47% loss of upper field of vision OU, with upper lid skin in repose and elevated by taping of the lid to demonstrate potential correction. This field shows that taping the lids significantly improved this patient's superior field of vision by approximately 42%, OU.

## 2020-12-15 NOTE — PATIENT DISCUSSION
Warm compress BID OS, FML 0.25% gtts TID-QID OS X 1 week then BID OS X 1 week, then QD OS X 1 week; AT QID OS.

## 2021-01-07 NOTE — PROCEDURE NOTE: CLINICAL

## 2021-01-14 NOTE — PROCEDURE NOTE: CLINICAL

## 2021-02-03 NOTE — PATIENT DISCUSSION
One week post op: great curve and shape, no infection, healing well, sutures intact and removed, use erythromycin QHS to upper eyelids x 7-10 days. Use Vitamin E oil/Skinuva QHS x 1 month to incisions after 10 days. Wear sunglasses and hat while outdoors. Avoid sun exposure. No restrictions in 5 days. No

## 2021-04-15 NOTE — PROCEDURE NOTE: CLINICAL

## 2021-04-22 NOTE — PROCEDURE NOTE: CLINICAL

## 2021-04-30 ENCOUNTER — ESTABLISHED COMPREHENSIVE EXAM (OUTPATIENT)
Dept: URBAN - METROPOLITAN AREA CLINIC 36 | Facility: CLINIC | Age: 26
End: 2021-04-30

## 2021-04-30 DIAGNOSIS — H52.7: ICD-10-CM

## 2021-04-30 DIAGNOSIS — Z01.00: ICD-10-CM

## 2021-04-30 PROCEDURE — 92014 COMPRE OPH EXAM EST PT 1/>: CPT

## 2021-04-30 PROCEDURE — 92015 DETERMINE REFRACTIVE STATE: CPT

## 2021-04-30 ASSESSMENT — VISUAL ACUITY
OS_CC: 20/25+2
OD_SC: J10
OD_SC: 20/400
OS_CC: J1
OD_CC: 20/20
OS_SC: J10
OS_SC: 20/400
OD_CC: J1

## 2021-04-30 ASSESSMENT — TONOMETRY
OS_IOP_MMHG: 14
OD_IOP_MMHG: 14

## 2021-07-07 NOTE — PATIENT DISCUSSION
Is This A New Presentation, Or A Follow-Up?: Scar Rx Alrex BID OU X 2 weeks. Additional History: New patient had surgery 2 weeks ago to remove keloid on the chest. Some scarring remains surrounding this removal.

## 2021-07-08 NOTE — PROCEDURE NOTE: CLINICAL

## 2021-07-15 NOTE — PATIENT DISCUSSION
Patient given a prescription for glasses with incorporated prism to control diplopia. 06-Feb-2018 14:04

## 2021-07-15 NOTE — PROCEDURE NOTE: CLINICAL

## 2021-10-06 NOTE — PATIENT DISCUSSION
Grade 2 hypertensive retinopathy.  Discussed ocular and systemic effects of systemic hypertension and stressed good blood pressure control.  Patient states her blood pressure has been running high.  Discussed stronger controll.

## 2021-10-07 NOTE — PROCEDURE NOTE: CLINICAL

## 2021-10-07 NOTE — PATIENT DISCUSSION
FOLLOW WITH OD. Hospitalist Discharge Summary Patient ID: 
Lukasz Becker [id number] 
6 y.o. 
1941 Admit date: 5/24/2020 11:00 PM 
Discharge date and time: 5/30/2020 Attending: Connor Go DO 
PCP:  Kemar López MD 
Treatment Team: Attending Provider: Connor Go DO; Consulting Provider: Valentina Chacon MD; Utilization Review: Karina Johnson RN; Care Manager: Drew Hancock Principal Diagnosis Acute respiratory failure with hypoxia ([address] Principal Problem: 
  Acute respiratory failure with hypoxia ([address] (5/24/2020) Active Problems: Hyponatremia (11/23/2015) Encounter for laboratory testing for COVID-19 virus (12/15/2015) Diabetes mellitus due to underlying condition with hyperglycemia ([address] (12/21/2015) Type 2 diabetes with nephropathy ([address] (7/3/2018) Malignant neoplasm metastatic to bone ([address] (6/26/2019) Normocytic anemia (5/24/2020) CAP (community acquired pneumonia) (5/24/2020) Pleural effusion on left (5/24/2020) Neutropenia ([address] (5/25/2020) Pleural effusion on right (5/25/2020) Hospital Course: 
Please refer to the admission H&P for details of presentation. In summary, the patient is Mr. 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 No chest pain, fevers, recent travel or sick contacts. Labs notable for chronic hyponatremia, LA 2.2, trop neg, BNP 1875. CXR showed RLL opacity with possible effusion. CT chest w contrast showed large b/l pleural effusions; negative for PE; and bone mets (known). Started on IV lasix and diuresing well, changing to oral lasix at discharge. S/p bilateral thora on 5/25 and again 5/28 . Cytology from 5/25 with atypical cells and culture negative, cyto from 5/28 pending.  Has been seen by oncology. Chemo held as inpatient. Will need close follow-up to discuss resuming. Tolerating room air 02 sat 95%. Ambulating with minimal assistance. Reporting constipation. Abd w/ good bowel sounds, soft, NT. Will add lactulose. Pt agreeable to discharge home. Have updated patient's wife by phone. Significant Diagnostic Studies:  
 
Final [99] 5/27/2020 07:39 5/27/2020 07:44 Study Result 2 View Chest X-Ray 5/27/2020 7:44 AM 
  
INDICATION: Shortness of breath, evaluate for increased lung effusion or 
infiltrates. 
  
COMPARISON: Chest x-ray 5/24/2020 
  
FINDINGS: Upright AP and Lateral views are submitted. Lung slightly better 
inflated. Cardiomediastinal silhouette stable. There does not appear to be 
increased vascular congestion or any pneumothorax. Bibasilar hazy densities still seen but stable on the left and may be slightly 
improved on the right. At the right upper lobe region there is persistent apical capping and increased 
hazy densities. 
  
IMPRESSION IMPRESSION: 
1. Moderate size bibasilar layering effusions and atelectasis, similar on the 
left, may be slightly improved on the right. As indicated correlate with 
directed chest ultrasound to assess for whether there is a drainable pleural 
effusion on either side. 
  
2. Stable findings at the right upper lobe region as well, no acute developing 
abnormalities. 
   
 
IMPRESSION IMPRESSION: 
1. No acute pulmonary emboli. 2. Large bilateral pleural effusions. . Inflammatory appearing nodularity in the 
bilateral upper lobes near the lung apices. 3. Multiple patchy sclerotic lesions throughout the thoracic spine and ribs. This is in keeping with the patient's history of osseous metastatic disease. 
  
  
SUMMARY: 
  
-  Left ventricle: Systolic function was normal. Ejection fraction was 
estimated in the range of 60 % to 65 %.  There were no regional wall motion 
abnormalities. 
  
 -  Pericardium: There was no pericardial effusion. 
  
Labs: Results:  
   
Chemistry Recent Labs 05/30/20 
[id number] 05/29/20 
8311 05/28/20 
8637 * 105* 106* * 128* 129*  
K 4.1 3.3* 3.3*  
CL 89* 90* 92* CO2 32 29 31 BUN 11 9 9 CREA 0.65* 0.50* 0.51* CA 9.3 8.6 8.7 AGAP 7 9 6* CBC w/Diff No results for input(s): WBC, RBC, HGB, HCT, PLT, GRANS, LYMPH, EOS, HGBEXT, HCTEXT, PLTEXT, HGBEXT, HCTEXT, PLTEXT in the last 72 hours. Cardiac Enzymes No results for input(s): CPK, CKND1, ELSY in the last 72 hours. No lab exists for component: Rosendo Afton Coagulation No results for input(s): PTP, INR, APTT, INREXT, INREXT in the last 72 hours. Lipid Panel No results found for: CHOL, CHOLPOCT, CHOLX, CHLST, CHOLV, 174659, HDL, HDLP, LDL, LDLC, DLDLP, 090137, VLDLC, VLDL, TGLX, TRIGL, TRIGP, TGLPOCT, CHHD, CHHDX  
BNP No results for input(s): BNPP in the last 72 hours. Liver Enzymes No results for input(s): TP, ALB, TBIL, AP in the last 72 hours. No lab exists for component: SGOT, GPT, DBIL Thyroid Studies Lab Results Component Value Date/Time TSH 4.460 (H) 10/26/2017 04:14 PM  
    
 
 
Discharge Exam: 
Visit Ines Foley /84 Pulse (!) 101 Temp 98.4 °F (36.9 °C) Resp 18 Wt 74.4 kg (164 lb) SpO2 95% BMI 22.24 kg/m² General appearance: alert, cooperative, no distress, appears stated age Lungs: slightly diminished at bases. On Room air Heart: regular rate and rhythm, S1, S2 normal, no murmur, click, rub or gallop Abdomen: soft, non-tender. Bowel sounds normal. No masses,  no organomegaly Extremities: no cyanosis or edema Neurologic: Grossly normal 
 
Disposition: home with [facility] Discharge Condition: stable Patient Instructions:  
Current Discharge Medication List  
  
START taking these medications Details  
potassium chloride (K-DUR, KLOR-CON) 20 mEq tablet Take 2 Tabs by mouth two (2) times a day. Qty: 120 Tab, Refills: 0 magnesium oxide (MAG-OX) 400 mg tablet Take 1 Tab by mouth daily. Qty: 30 Tab, Refills: 0  
  
furosemide (LASIX) 20 mg tablet Take 1 Tab by mouth two (2) times a day. Qty: 60 Tab, Refills: 0 CONTINUE these medications which have NOT CHANGED Details  
lisinopriL (PRINIVIL, ZESTRIL) 20 mg tablet Take 20 mg by mouth daily. glimepiride (AMARYL) 4 mg tablet Take 4 mg by mouth every morning. tolvaptan (Samsca) tab tablet Take 15 mg by mouth daily. traMADoL (ULTRAM) 50 mg tablet Take 1 Tab by mouth every six (6) hours as needed for Pain for up to 30 days. Max Daily Amount: 200 mg. Indications: pain 
Qty: 90 Tab, Refills: 0 Associated Diagnoses: Anal carcinoma ([address] meloxicam (MOBIC) 7.5 mg tablet Take 1 Tab by mouth daily. Indications: arthralgias 
Qty: 30 Tab, Refills: 1  
  
sodium chloride 1 gram tablet TAKE 2 TABS BY MOUTH TWO (2) TIMES A DAY. Qty: 180 Tab, Refills: 3 Associated Diagnoses: Hyponatremia TRUEPLUS LANCETS 28 gauge misc   
  
ferrous sulfate 324 mg (65 mg iron) tablet Take  by mouth Daily (before breakfast). docusate sodium (COLACE) 100 mg capsule Take 100 mg by mouth as needed for Constipation. amLODIPine (NORVASC) 10 mg tablet Take 10 mg by mouth daily. In am  
  
metFORMIN (GLUCOPHAGE) 1,000 mg tablet Take 1,000 mg by mouth daily. In am  Indications: type 2 diabetes mellitus Activity: as tolerated Diet: diabetic Follow-up ·  PCP in 1 week, Oncology in 1 week. Pulm in 1-2 weeks Time spent to discharge patient 35 minutes Signed: 
Clemente Mendez DO 
5/30/2020 
10:23 AM

## 2021-10-07 NOTE — PATIENT DISCUSSION
No rt/RD, Retinal tear and detachment warning symptoms reviewed and patient instructed to call immediately if increasing floaters, flashes, or decreasing peripheral vision.

## 2021-10-14 NOTE — PROCEDURE NOTE: CLINICAL

## 2021-10-14 NOTE — PATIENT DISCUSSION
Stable, Inactive Discussed with the patient the importance of good control of their blood sugar, blood pressure, cholesterol, diet, exercise, weight, and medication usage under the guidance of their diabetic doctor to prevent/halt diabetic retinopathy.

## 2021-12-21 NOTE — PATIENT DISCUSSION
Recommended Eylea injection today. The injection was given and tolerated well by patient. Post-injection instructions were reviewed and understood by the patient. Quality 226: Preventive Care And Screening: Tobacco Use: Screening And Cessation Intervention: Patient screened for tobacco use and is an ex/non-smoker Quality 431: Preventive Care And Screening: Unhealthy Alcohol Use - Screening: Patient screened for unhealthy alcohol use using a single question and scores less than 2 times per year Quality 130: Documentation Of Current Medications In The Medical Record: Current Medications Documented Detail Level: Detailed

## 2022-01-06 NOTE — PATIENT DISCUSSION
Inactive, Discussed the importance of blood sugar control in the prevention of ocular complications.

## 2022-01-06 NOTE — PROCEDURE NOTE: CLINICAL

## 2022-01-13 NOTE — PROCEDURE NOTE: CLINICAL

## 2022-04-07 NOTE — PROCEDURE NOTE: CLINICAL

## 2022-04-14 NOTE — PROCEDURE NOTE: CLINICAL

## 2022-06-07 NOTE — PATIENT DISCUSSION
Patient understands condition, prognosis and need for follow up care. Detail Level: Zone Moisturizer Recommendations: Hydroboost Detail Level: Detailed

## 2022-06-30 NOTE — PROCEDURE NOTE: CLINICAL

## 2022-06-30 NOTE — PATIENT DISCUSSION
Recommended artificial tears to use: 1 drop 4x a day in both eyes.  If no improvement consider Plugs/Restasis.

## 2022-06-30 NOTE — PATIENT DISCUSSION
Dry Eye SyndromeAn examination that was significantly and separately identifiable from the procedure performed today was also completed for Dry Eye Syndrome.

## 2022-07-07 NOTE — PROCEDURE NOTE: CLINICAL

## 2022-09-22 NOTE — PROCEDURE NOTE: CLINICAL

## 2022-11-09 NOTE — PROCEDURE NOTE: CLINICAL
PROCEDURE NOTE: Punctal Plugs, Silicone #1 Right Lower Lid. Diagnosis: Dermatochalasis. Anesthesia: Proparacaine 0.5%. Prior to treatment, the risks/benefits/alternatives were discussed. The patient wished to proceed with procedure. 1 drop of Proparacaine 0.5% was instilled in eye. 0.* mm permanent silicone plugs were inserted in * eyelids. Lot # * and exp *  Patient tolerated procedure well. There were no complications. Post procedure instructions given. Jerri Houston

## 2022-12-13 NOTE — PROCEDURE NOTE: CLINICAL
PROCEDURE NOTE: Bandage Contact Lens  #1 OS. Diagnosis: Keratoconjunctivitis Sicca, Not Specified As Sjögren's. A therapeutic soft contact lens of the of the appropriate size and base curve was selected and then applied to the cornea. The lens fit well and moved appropriately. Av Oasys BCL used.

## 2022-12-13 NOTE — PATIENT DISCUSSION
12/13/22:  No sig improvement with steroid gtts.  Placed BCL OS today- use antibiotic gtt TID OS until BCL is removed.  Cont AT PRN, no gel or bibi with BCL.

## 2022-12-15 NOTE — PATIENT DISCUSSION
12/15/22:  Removed BCL today- still has significant staining, diffuse, central, no dendrite or ulcer.  Use AT Q1H and gel or bibi QHS OS.  Use Maxitrol gtts QID OS until Monday.  Pt will call Monday to let us know how he's doing.

## 2022-12-15 NOTE — PROCEDURE NOTE: CLINICAL
PROCEDURE NOTE: Bandage Contact Lens Removal #1 OS. Diagnosis: Keratoconjunctivitis Sicca, Not Specified As Sjögren's. The therapeutic (bandage) contact lens was removed. The eye was then re-examined at the slit lamp and post procedure instructions  reviewed. Courtney Escobar

## 2022-12-22 NOTE — PATIENT DISCUSSION
decreased vision and some pain for 6 weeks, diffuse PEE with pseudod, will start Valtrex 1g TID, and Moxi QID, can place amniom.

## 2022-12-22 NOTE — PATIENT DISCUSSION
decreased vision and some pain for 6 weeks, diffuse PEE with pseudod, will start valtrex 1 g Tid, and moxi qid, can place amniom.

## 2022-12-22 NOTE — PROCEDURE NOTE: CLINICAL
PROCEDURE NOTE: Xcellerate Amniotic Membrane #1 OS. Diagnosis: Herpes Zoster Ophthalmicus. Prior to the procedure, the risks/benefits/alternatives were discussed. The patient wished to proceed with the procedure. Informed consent was obtained. Expiration date *, and Lot #*. A speculum was placed to retract the lids after topical anesthetic and prep were carried out. The surface was denuded with a forcep and the debris was cleared, and the Xcellerate amniotic membrane graft was applied to the affected area. A bandage contact lens applied thereafter. Patient tolerated the procedure well. Post-operative instructions were given to the patient. Gema Mayberry

## 2022-12-22 NOTE — PATIENT DISCUSSION
Recommended artificial tears to use: 1 drop 4x a day in both eyes.  If no improvement, consider Plugs/Restasis.

## 2022-12-22 NOTE — PATIENT DISCUSSION
PED, No Fluid, no treatment at today's visit, continue current treatment interval.  Patient to return to clinic as scheduled.

## 2023-01-26 NOTE — PROCEDURE NOTE: CLINICAL
The patient was identified visually and verbally by the  surgeon. The procedure eye was marked with an adhesive arrow sticker. The  risks, benefits, alternatives and possible complications of the procedure  were discussed with the patient and informed consent was obtained. The  patient was positioned in the chair. Proparacaine, Betadine, Akten administered prior to injection. Additional Betadine was used. A speculum  was used to hold the eyelid open. A caliper was used to marked the site of  injection 3.5-4mm from the limbus. Betadine was placed on the site . A sterile 30 or 31 gauge needle with the  medication entered the vitreous cavity and the medication was  administered. The speculum was removed. The patient was instructed to call immediately if any  significant visual loss, swelling, discharge, or pain occurred Intravitreal injection. Patient tolerated the procedure well. There were no complications. CF vision checked.  Post procedure instructions given

## 2023-01-26 NOTE — PATIENT DISCUSSION
slightly improved, va improved, CL removed and replaced, patient did not start valtrex,  restart valtrex 1mg tablet 3 x a day for 2 weeks. Also placed BCL in office today with no comps., F/U WITH JRL, continue antibiotics.

## 2023-01-26 NOTE — PATIENT DISCUSSION
An examination that was significantly and separately identifiable from the procedure performed today was also completed for Herpes Zoster Ophthalmicus.

## 2023-01-27 NOTE — PATIENT DISCUSSION
eylea injection recommended today after discussion of benefits, risks,  The injection was administered without complication. Post-injection instructions were reviewed and understood by the patient. [de-identified] : X-Ray AP/Frog pelvis were obtained today and reviewed today 01/25/2023 hips are located in good coverage. [place] line is intact . Ossific nuclei are symmetrical.. No evidence DDH

## 2023-02-24 NOTE — PATIENT DISCUSSION
Instructed to call immediately if any new distortion, blurring, decreased vision, or eye pain. Primary Defect Width In Cm (Final Defect Size - Required For Flaps/Grafts): 0.9

## 2023-04-21 ENCOUNTER — COMPREHENSIVE EXAM (OUTPATIENT)
Dept: URBAN - METROPOLITAN AREA CLINIC 46 | Facility: CLINIC | Age: 28
End: 2023-04-21

## 2023-04-21 DIAGNOSIS — H52.7: ICD-10-CM

## 2023-04-21 PROCEDURE — 92015 DETERMINE REFRACTIVE STATE: CPT

## 2023-04-21 PROCEDURE — 92014 COMPRE OPH EXAM EST PT 1/>: CPT

## 2023-04-21 ASSESSMENT — TONOMETRY
OS_IOP_MMHG: 16
OD_IOP_MMHG: 15

## 2023-04-21 ASSESSMENT — VISUAL ACUITY
OS_SC: J1+ @6"
OD_CC: 20/20
OS_SC: CF 6FT
OD_SC: J1 @10"
OS_CC: 20/20
OD_SC: 20/400

## 2023-06-07 ENCOUNTER — CONTACT LENSES/GLASSES VISIT (OUTPATIENT)
Dept: URBAN - METROPOLITAN AREA CLINIC 46 | Facility: CLINIC | Age: 28
End: 2023-06-07

## 2023-06-07 DIAGNOSIS — Z46.0: ICD-10-CM

## 2023-06-07 PROCEDURE — 92310-3 LEVEL 3 CONTACT LENS MANAGEMENT

## 2023-07-09 NOTE — PATIENT DISCUSSION
Discussed pt bp with MD VERNON advised we are not treating BP at this time as advised by [facility]. Eylea injection recommended today after discussion of benefits, risks, alternatives. The injection was administered without complication. Post-injection instructions were reviewed and understood by the patient.

## 2024-03-25 NOTE — PATIENT DISCUSSION
Discussed lid hygiene, warm compress and eyelid wash. Dr. Mcallister stated that he would give cortisone shoulder injections to the pt at anytime as it has been over three months. Information was routed to the FP PSR 2 for scheduling.

## 2024-07-25 NOTE — PATIENT DISCUSSION
Discussed with patient.  Pain MD is on vacation.  He is referred to Dr. Edwin Garcia.  He is recommended to increase his tramadol to 100 mg up to 3 times a day.  He will call me if no relief   Inactive, Discussed the importance of blood sugar control in the prevention of ocular complications.

## 2024-09-12 NOTE — PATIENT DISCUSSION
Increased prism. [Negative] : Heme/Lymph [Muscle Pain] : muscle pain [Chest Pain] : no chest pain [Shortness Of Breath] : no shortness of breath [Constipation] : no constipation [[id number]] : Bilateral LE muscle pain, unpleasant urge to move the legs

## 2024-12-16 NOTE — PATIENT DISCUSSION
PA started for ritalin.  Key is [id number].  PA approved. Atient and pharmacy made aware.    Will continue to MONITOR the POSTERIOR CAPSULE.